# Patient Record
Sex: FEMALE | Race: WHITE | NOT HISPANIC OR LATINO | URBAN - METROPOLITAN AREA
[De-identification: names, ages, dates, MRNs, and addresses within clinical notes are randomized per-mention and may not be internally consistent; named-entity substitution may affect disease eponyms.]

---

## 2022-10-15 ENCOUNTER — INPATIENT (INPATIENT)
Facility: HOSPITAL | Age: 27
LOS: 2 days | Discharge: HOME | End: 2022-10-18
Attending: INTERNAL MEDICINE | Admitting: INTERNAL MEDICINE

## 2022-10-15 VITALS
RESPIRATION RATE: 20 BRPM | WEIGHT: 130.07 LBS | TEMPERATURE: 98 F | SYSTOLIC BLOOD PRESSURE: 150 MMHG | OXYGEN SATURATION: 99 % | DIASTOLIC BLOOD PRESSURE: 99 MMHG | HEART RATE: 97 BPM

## 2022-10-15 LAB
ALBUMIN SERPL ELPH-MCNC: 5 G/DL — SIGNIFICANT CHANGE UP (ref 3.5–5.2)
ALP SERPL-CCNC: 38 U/L — SIGNIFICANT CHANGE UP (ref 30–115)
ALT FLD-CCNC: 15 U/L — SIGNIFICANT CHANGE UP (ref 0–41)
ANION GAP SERPL CALC-SCNC: 13 MMOL/L — SIGNIFICANT CHANGE UP (ref 7–14)
AST SERPL-CCNC: 21 U/L — SIGNIFICANT CHANGE UP (ref 0–41)
BASOPHILS # BLD AUTO: 0.05 K/UL — SIGNIFICANT CHANGE UP (ref 0–0.2)
BASOPHILS NFR BLD AUTO: 0.6 % — SIGNIFICANT CHANGE UP (ref 0–1)
BILIRUB SERPL-MCNC: 0.3 MG/DL — SIGNIFICANT CHANGE UP (ref 0.2–1.2)
BUN SERPL-MCNC: 18 MG/DL — SIGNIFICANT CHANGE UP (ref 10–20)
CALCIUM SERPL-MCNC: 10.1 MG/DL — SIGNIFICANT CHANGE UP (ref 8.4–10.5)
CHLORIDE SERPL-SCNC: 101 MMOL/L — SIGNIFICANT CHANGE UP (ref 98–110)
CO2 SERPL-SCNC: 25 MMOL/L — SIGNIFICANT CHANGE UP (ref 17–32)
CREAT SERPL-MCNC: 0.7 MG/DL — SIGNIFICANT CHANGE UP (ref 0.7–1.5)
EGFR: 123 ML/MIN/1.73M2 — SIGNIFICANT CHANGE UP
EOSINOPHIL # BLD AUTO: 0.18 K/UL — SIGNIFICANT CHANGE UP (ref 0–0.7)
EOSINOPHIL NFR BLD AUTO: 2 % — SIGNIFICANT CHANGE UP (ref 0–8)
GLUCOSE SERPL-MCNC: 94 MG/DL — SIGNIFICANT CHANGE UP (ref 70–99)
HCT VFR BLD CALC: 34.9 % — LOW (ref 37–47)
HGB BLD-MCNC: 12.5 G/DL — SIGNIFICANT CHANGE UP (ref 12–16)
IMM GRANULOCYTES NFR BLD AUTO: 0.1 % — SIGNIFICANT CHANGE UP (ref 0.1–0.3)
LIDOCAIN IGE QN: 30 U/L — SIGNIFICANT CHANGE UP (ref 7–60)
LYMPHOCYTES # BLD AUTO: 2.33 K/UL — SIGNIFICANT CHANGE UP (ref 1.2–3.4)
LYMPHOCYTES # BLD AUTO: 26.5 % — SIGNIFICANT CHANGE UP (ref 20.5–51.1)
MCHC RBC-ENTMCNC: 31.4 PG — HIGH (ref 27–31)
MCHC RBC-ENTMCNC: 35.8 G/DL — SIGNIFICANT CHANGE UP (ref 32–37)
MCV RBC AUTO: 87.7 FL — SIGNIFICANT CHANGE UP (ref 81–99)
MONOCYTES # BLD AUTO: 0.87 K/UL — HIGH (ref 0.1–0.6)
MONOCYTES NFR BLD AUTO: 9.9 % — HIGH (ref 1.7–9.3)
NEUTROPHILS # BLD AUTO: 5.36 K/UL — SIGNIFICANT CHANGE UP (ref 1.4–6.5)
NEUTROPHILS NFR BLD AUTO: 60.9 % — SIGNIFICANT CHANGE UP (ref 42.2–75.2)
NRBC # BLD: 0 /100 WBCS — SIGNIFICANT CHANGE UP (ref 0–0)
PLATELET # BLD AUTO: 255 K/UL — SIGNIFICANT CHANGE UP (ref 130–400)
POTASSIUM SERPL-MCNC: 4.1 MMOL/L — SIGNIFICANT CHANGE UP (ref 3.5–5)
POTASSIUM SERPL-SCNC: 4.1 MMOL/L — SIGNIFICANT CHANGE UP (ref 3.5–5)
PROT SERPL-MCNC: 7.9 G/DL — SIGNIFICANT CHANGE UP (ref 6–8)
RBC # BLD: 3.98 M/UL — LOW (ref 4.2–5.4)
RBC # FLD: 12.4 % — SIGNIFICANT CHANGE UP (ref 11.5–14.5)
SODIUM SERPL-SCNC: 139 MMOL/L — SIGNIFICANT CHANGE UP (ref 135–146)
WBC # BLD: 8.8 K/UL — SIGNIFICANT CHANGE UP (ref 4.8–10.8)
WBC # FLD AUTO: 8.8 K/UL — SIGNIFICANT CHANGE UP (ref 4.8–10.8)

## 2022-10-15 PROCEDURE — 99291 CRITICAL CARE FIRST HOUR: CPT

## 2022-10-15 RX ORDER — DEXAMETHASONE 0.5 MG/5ML
10 ELIXIR ORAL ONCE
Refills: 0 | Status: COMPLETED | OUTPATIENT
Start: 2022-10-15 | End: 2022-10-15

## 2022-10-15 RX ORDER — DIPHENHYDRAMINE HCL 50 MG
25 CAPSULE ORAL ONCE
Refills: 0 | Status: DISCONTINUED | OUTPATIENT
Start: 2022-10-15 | End: 2022-10-15

## 2022-10-15 RX ORDER — DIPHENHYDRAMINE HCL 50 MG
50 CAPSULE ORAL ONCE
Refills: 0 | Status: COMPLETED | OUTPATIENT
Start: 2022-10-15 | End: 2022-10-15

## 2022-10-15 RX ORDER — ONDANSETRON 8 MG/1
4 TABLET, FILM COATED ORAL ONCE
Refills: 0 | Status: COMPLETED | OUTPATIENT
Start: 2022-10-15 | End: 2022-10-15

## 2022-10-15 RX ORDER — FAMOTIDINE 10 MG/ML
40 INJECTION INTRAVENOUS ONCE
Refills: 0 | Status: COMPLETED | OUTPATIENT
Start: 2022-10-15 | End: 2022-10-15

## 2022-10-15 RX ORDER — SODIUM CHLORIDE 9 MG/ML
1000 INJECTION INTRAMUSCULAR; INTRAVENOUS; SUBCUTANEOUS ONCE
Refills: 0 | Status: COMPLETED | OUTPATIENT
Start: 2022-10-15 | End: 2022-10-15

## 2022-10-15 RX ADMIN — SODIUM CHLORIDE 1000 MILLILITER(S): 9 INJECTION INTRAMUSCULAR; INTRAVENOUS; SUBCUTANEOUS at 23:26

## 2022-10-15 RX ADMIN — ONDANSETRON 4 MILLIGRAM(S): 8 TABLET, FILM COATED ORAL at 23:32

## 2022-10-15 RX ADMIN — Medication 10 MILLIGRAM(S): at 23:25

## 2022-10-15 RX ADMIN — Medication 50 MILLIGRAM(S): at 23:26

## 2022-10-15 RX ADMIN — FAMOTIDINE 40 MILLIGRAM(S): 10 INJECTION INTRAVENOUS at 23:26

## 2022-10-15 NOTE — ED ADULT TRIAGE NOTE - CHIEF COMPLAINT QUOTE
Pt having allergic reaction with angioedema and SOB that started 2 hours ago to unknown substance. Pt states her stomach is also hurting

## 2022-10-16 LAB
ALBUMIN SERPL ELPH-MCNC: 4.5 G/DL — SIGNIFICANT CHANGE UP (ref 3.5–5.2)
ALP SERPL-CCNC: 34 U/L — SIGNIFICANT CHANGE UP (ref 30–115)
ALT FLD-CCNC: 13 U/L — SIGNIFICANT CHANGE UP (ref 0–41)
ANION GAP SERPL CALC-SCNC: 9 MMOL/L — SIGNIFICANT CHANGE UP (ref 7–14)
APPEARANCE UR: CLEAR — SIGNIFICANT CHANGE UP
AST SERPL-CCNC: 15 U/L — SIGNIFICANT CHANGE UP (ref 0–41)
BACTERIA # UR AUTO: NEGATIVE — SIGNIFICANT CHANGE UP
BASOPHILS # BLD AUTO: 0.01 K/UL — SIGNIFICANT CHANGE UP (ref 0–0.2)
BASOPHILS NFR BLD AUTO: 0.2 % — SIGNIFICANT CHANGE UP (ref 0–1)
BILIRUB SERPL-MCNC: 0.3 MG/DL — SIGNIFICANT CHANGE UP (ref 0.2–1.2)
BILIRUB UR-MCNC: NEGATIVE — SIGNIFICANT CHANGE UP
BLD GP AB SCN SERPL QL: SIGNIFICANT CHANGE UP
BUN SERPL-MCNC: 13 MG/DL — SIGNIFICANT CHANGE UP (ref 10–20)
CALCIUM SERPL-MCNC: 9.4 MG/DL — SIGNIFICANT CHANGE UP (ref 8.4–10.5)
CHLORIDE SERPL-SCNC: 101 MMOL/L — SIGNIFICANT CHANGE UP (ref 98–110)
CO2 SERPL-SCNC: 28 MMOL/L — SIGNIFICANT CHANGE UP (ref 17–32)
COLOR SPEC: SIGNIFICANT CHANGE UP
CREAT SERPL-MCNC: 0.7 MG/DL — SIGNIFICANT CHANGE UP (ref 0.7–1.5)
CRP SERPL-MCNC: 11.3 MG/L — HIGH
DIFF PNL FLD: ABNORMAL
EGFR: 123 ML/MIN/1.73M2 — SIGNIFICANT CHANGE UP
EOSINOPHIL # BLD AUTO: 0 K/UL — SIGNIFICANT CHANGE UP (ref 0–0.7)
EOSINOPHIL NFR BLD AUTO: 0 % — SIGNIFICANT CHANGE UP (ref 0–8)
EPI CELLS # UR: 3 /HPF — SIGNIFICANT CHANGE UP (ref 0–5)
ERYTHROCYTE [SEDIMENTATION RATE] IN BLOOD: 35 MM/HR — HIGH (ref 0–20)
GLUCOSE SERPL-MCNC: 212 MG/DL — HIGH (ref 70–99)
GLUCOSE UR QL: NEGATIVE — SIGNIFICANT CHANGE UP
HCG SERPL QL: NEGATIVE — SIGNIFICANT CHANGE UP
HCT VFR BLD CALC: 32.5 % — LOW (ref 37–47)
HGB BLD-MCNC: 11.4 G/DL — LOW (ref 12–16)
HYALINE CASTS # UR AUTO: 0 /LPF — SIGNIFICANT CHANGE UP (ref 0–7)
IMM GRANULOCYTES NFR BLD AUTO: 0.2 % — SIGNIFICANT CHANGE UP (ref 0.1–0.3)
KETONES UR-MCNC: SIGNIFICANT CHANGE UP
LEUKOCYTE ESTERASE UR-ACNC: ABNORMAL
LYMPHOCYTES # BLD AUTO: 0.49 K/UL — LOW (ref 1.2–3.4)
LYMPHOCYTES # BLD AUTO: 9.9 % — LOW (ref 20.5–51.1)
MCHC RBC-ENTMCNC: 31.1 PG — HIGH (ref 27–31)
MCHC RBC-ENTMCNC: 35.1 G/DL — SIGNIFICANT CHANGE UP (ref 32–37)
MCV RBC AUTO: 88.8 FL — SIGNIFICANT CHANGE UP (ref 81–99)
MONOCYTES # BLD AUTO: 0.16 K/UL — SIGNIFICANT CHANGE UP (ref 0.1–0.6)
MONOCYTES NFR BLD AUTO: 3.2 % — SIGNIFICANT CHANGE UP (ref 1.7–9.3)
NEUTROPHILS # BLD AUTO: 4.29 K/UL — SIGNIFICANT CHANGE UP (ref 1.4–6.5)
NEUTROPHILS NFR BLD AUTO: 86.5 % — HIGH (ref 42.2–75.2)
NITRITE UR-MCNC: NEGATIVE — SIGNIFICANT CHANGE UP
NRBC # BLD: 0 /100 WBCS — SIGNIFICANT CHANGE UP (ref 0–0)
PH UR: 6.5 — SIGNIFICANT CHANGE UP (ref 5–8)
PLATELET # BLD AUTO: 252 K/UL — SIGNIFICANT CHANGE UP (ref 130–400)
POTASSIUM SERPL-MCNC: 4.5 MMOL/L — SIGNIFICANT CHANGE UP (ref 3.5–5)
POTASSIUM SERPL-SCNC: 4.5 MMOL/L — SIGNIFICANT CHANGE UP (ref 3.5–5)
PROT SERPL-MCNC: 7 G/DL — SIGNIFICANT CHANGE UP (ref 6–8)
PROT UR-MCNC: SIGNIFICANT CHANGE UP
RBC # BLD: 3.66 M/UL — LOW (ref 4.2–5.4)
RBC # FLD: 12.4 % — SIGNIFICANT CHANGE UP (ref 11.5–14.5)
RBC CASTS # UR COMP ASSIST: 2 /HPF — SIGNIFICANT CHANGE UP (ref 0–4)
SARS-COV-2 RNA SPEC QL NAA+PROBE: SIGNIFICANT CHANGE UP
SODIUM SERPL-SCNC: 138 MMOL/L — SIGNIFICANT CHANGE UP (ref 135–146)
SP GR SPEC: 1.05 — HIGH (ref 1.01–1.03)
UROBILINOGEN FLD QL: SIGNIFICANT CHANGE UP
WBC # BLD: 4.96 K/UL — SIGNIFICANT CHANGE UP (ref 4.8–10.8)
WBC # FLD AUTO: 4.96 K/UL — SIGNIFICANT CHANGE UP (ref 4.8–10.8)
WBC UR QL: 6 /HPF — HIGH (ref 0–5)

## 2022-10-16 PROCEDURE — 76830 TRANSVAGINAL US NON-OB: CPT | Mod: 26

## 2022-10-16 PROCEDURE — 99223 1ST HOSP IP/OBS HIGH 75: CPT

## 2022-10-16 PROCEDURE — 99222 1ST HOSP IP/OBS MODERATE 55: CPT

## 2022-10-16 PROCEDURE — 71045 X-RAY EXAM CHEST 1 VIEW: CPT | Mod: 26

## 2022-10-16 PROCEDURE — 74177 CT ABD & PELVIS W/CONTRAST: CPT | Mod: 26,MA

## 2022-10-16 RX ORDER — ONDANSETRON 8 MG/1
4 TABLET, FILM COATED ORAL EVERY 8 HOURS
Refills: 0 | Status: DISCONTINUED | OUTPATIENT
Start: 2022-10-16 | End: 2022-10-18

## 2022-10-16 RX ORDER — FAMOTIDINE 10 MG/ML
20 INJECTION INTRAVENOUS DAILY
Refills: 0 | Status: DISCONTINUED | OUTPATIENT
Start: 2022-10-16 | End: 2022-10-16

## 2022-10-16 RX ORDER — CHLORHEXIDINE GLUCONATE 213 G/1000ML
1 SOLUTION TOPICAL DAILY
Refills: 0 | Status: DISCONTINUED | OUTPATIENT
Start: 2022-10-16 | End: 2022-10-18

## 2022-10-16 RX ORDER — CEFTRIAXONE 500 MG/1
1000 INJECTION, POWDER, FOR SOLUTION INTRAMUSCULAR; INTRAVENOUS EVERY 24 HOURS
Refills: 0 | Status: DISCONTINUED | OUTPATIENT
Start: 2022-10-16 | End: 2022-10-18

## 2022-10-16 RX ORDER — ACETAMINOPHEN 500 MG
650 TABLET ORAL EVERY 6 HOURS
Refills: 0 | Status: DISCONTINUED | OUTPATIENT
Start: 2022-10-16 | End: 2022-10-18

## 2022-10-16 RX ORDER — ENOXAPARIN SODIUM 100 MG/ML
40 INJECTION SUBCUTANEOUS EVERY 24 HOURS
Refills: 0 | Status: DISCONTINUED | OUTPATIENT
Start: 2022-10-16 | End: 2022-10-18

## 2022-10-16 RX ORDER — CHLORHEXIDINE GLUCONATE 213 G/1000ML
1 SOLUTION TOPICAL
Refills: 0 | Status: DISCONTINUED | OUTPATIENT
Start: 2022-10-16 | End: 2022-10-16

## 2022-10-16 RX ORDER — DIPHENHYDRAMINE HCL 50 MG
25 CAPSULE ORAL ONCE
Refills: 0 | Status: COMPLETED | OUTPATIENT
Start: 2022-10-16 | End: 2022-10-16

## 2022-10-16 RX ORDER — LORATADINE 10 MG/1
10 TABLET ORAL
Refills: 0 | Status: DISCONTINUED | OUTPATIENT
Start: 2022-10-16 | End: 2022-10-18

## 2022-10-16 RX ORDER — SUMATRIPTAN SUCCINATE 4 MG/.5ML
50 INJECTION, SOLUTION SUBCUTANEOUS ONCE
Refills: 0 | Status: COMPLETED | OUTPATIENT
Start: 2022-10-16 | End: 2022-10-16

## 2022-10-16 RX ORDER — FAMOTIDINE 10 MG/ML
20 INJECTION INTRAVENOUS
Refills: 0 | Status: DISCONTINUED | OUTPATIENT
Start: 2022-10-16 | End: 2022-10-18

## 2022-10-16 RX ORDER — INFLUENZA VIRUS VACCINE 15; 15; 15; 15 UG/.5ML; UG/.5ML; UG/.5ML; UG/.5ML
0.5 SUSPENSION INTRAMUSCULAR ONCE
Refills: 0 | Status: DISCONTINUED | OUTPATIENT
Start: 2022-10-16 | End: 2022-10-18

## 2022-10-16 RX ORDER — SUMATRIPTAN SUCCINATE 4 MG/.5ML
6 INJECTION, SOLUTION SUBCUTANEOUS ONCE
Refills: 0 | Status: COMPLETED | OUTPATIENT
Start: 2022-10-16 | End: 2022-10-16

## 2022-10-16 RX ORDER — TRANEXAMIC ACID 100 MG/ML
1000 INJECTION, SOLUTION INTRAVENOUS ONCE
Refills: 0 | Status: COMPLETED | OUTPATIENT
Start: 2022-10-16 | End: 2022-10-16

## 2022-10-16 RX ORDER — LANOLIN ALCOHOL/MO/W.PET/CERES
3 CREAM (GRAM) TOPICAL AT BEDTIME
Refills: 0 | Status: DISCONTINUED | OUTPATIENT
Start: 2022-10-16 | End: 2022-10-18

## 2022-10-16 RX ORDER — NICOTINE POLACRILEX 2 MG
1 GUM BUCCAL DAILY
Refills: 0 | Status: DISCONTINUED | OUTPATIENT
Start: 2022-10-16 | End: 2022-10-18

## 2022-10-16 RX ADMIN — FAMOTIDINE 20 MILLIGRAM(S): 10 INJECTION INTRAVENOUS at 11:35

## 2022-10-16 RX ADMIN — LORATADINE 10 MILLIGRAM(S): 10 TABLET ORAL at 17:31

## 2022-10-16 RX ADMIN — ENOXAPARIN SODIUM 40 MILLIGRAM(S): 100 INJECTION SUBCUTANEOUS at 05:26

## 2022-10-16 RX ADMIN — LORATADINE 10 MILLIGRAM(S): 10 TABLET ORAL at 05:24

## 2022-10-16 RX ADMIN — CEFTRIAXONE 100 MILLIGRAM(S): 500 INJECTION, POWDER, FOR SOLUTION INTRAMUSCULAR; INTRAVENOUS at 15:06

## 2022-10-16 RX ADMIN — Medication 1 PATCH: at 11:35

## 2022-10-16 RX ADMIN — Medication 1 PATCH: at 21:08

## 2022-10-16 RX ADMIN — TRANEXAMIC ACID 220 MILLIGRAM(S): 100 INJECTION, SOLUTION INTRAVENOUS at 03:36

## 2022-10-16 RX ADMIN — FAMOTIDINE 20 MILLIGRAM(S): 10 INJECTION INTRAVENOUS at 17:31

## 2022-10-16 RX ADMIN — Medication 60 MILLIGRAM(S): at 05:24

## 2022-10-16 RX ADMIN — Medication 25 MILLIGRAM(S): at 14:42

## 2022-10-16 RX ADMIN — SUMATRIPTAN SUCCINATE 6 MILLIGRAM(S): 4 INJECTION, SOLUTION SUBCUTANEOUS at 14:29

## 2022-10-16 NOTE — ED ADULT NURSE NOTE - FINAL NURSING ELECTRONIC SIGNATURE
Writer assuming care at this time, verbal report received from previous shift RN.    
16-Oct-2022 03:48

## 2022-10-16 NOTE — ED PROVIDER NOTE - OBJECTIVE STATEMENT
27 y.o. female, no PMH, c/o lip swelling. Patient stated that she started to notice a cramping diffuse abdominal pain yesterday and attributed this to her period pains although they felt different from her usual symptoms. Then two hours prior to arrival she noticed that her upper lip started swelling. She states that she has had this happen before and described it as having allergic reaction. She does not know what she is allergic to. She stated the last time she recalls having this episode was after her breast implant surgery. She denies any change in her diet, shortness of breath, nausea, vomiting, rash, pruritus, OCP use or urinary symptoms. She denies any family history of pertinent mucosal swelling or "allergic reactions". Of note, patient states that her blood pressure is usually elevated but takes no  medication for it.

## 2022-10-16 NOTE — CONSULT NOTE ADULT - ATTENDING COMMENTS
Attending Statement: I have personally performed a face to face diagnostic evaluation on this patient. The patient is suffering from:  Angioedema  I have made amendments to the documentation where necessary. I have personally seen and examined this patient.  I have fully participated in the care of this patient.  I have reviewed all pertinent clinical information, including history, physical exam, plan and note.

## 2022-10-16 NOTE — H&P ADULT - NSHPLABSRESULTS_GEN_ALL_CORE
LABS:                        12.5   8.80  )-----------( 255      ( 15 Oct 2022 22:50 )             34.9     Hb Trend: 12.5<--  WBC Trend: 8.80<--  Plt Trend: 255<--          10-15    139  |  101  |  18  ----------------------------<  94  4.1   |  25  |  0.7    Ca    10.1      15 Oct 2022 22:50    TPro  7.9  /  Alb  5.0  /  TBili  0.3  /  DBili  x   /  AST  21  /  ALT  15  /  AlkPhos  38  10-15        Urinalysis Basic - ( 16 Oct 2022 02:41 )    Color: Light Yellow / Appearance: Clear / S.050 / pH: x  Gluc: x / Ketone: Trace  / Bili: Negative / Urobili: <2 mg/dL   Blood: x / Protein: Trace / Nitrite: Negative   Leuk Esterase: Small / RBC: x / WBC x   Sq Epi: x / Non Sq Epi: x / Bacteria: x      IMAGING:  reviewed

## 2022-10-16 NOTE — CONSULT NOTE ADULT - SUBJECTIVE AND OBJECTIVE BOX
Patient is a 27y old  Female who presents with a chief complaint of Angioedema (16 Oct 2022 07:49)      HPI:  28 y/o female with no pertinent medical history presenting with complaint of lip swelling. Patient stated that she started to notice a cramping diffuse abdominal pain yesterday and attributed this to her period pains although they felt different from her usual symptoms. Then two hous prior to presentation in the ED, she noticed that her upper lip started swelling. She states that she has had this happen before and described it as  having allergic reaction. She does not know what she is allergic to. She stated the last time she recalls having this episode was after her breast implant surgery. She denies any change in her diet, shortness of breath, nausea, vomiting, rash, pruritus, OCP use or urinary symptoms. She denies any family history of pertinent mucosal swelling or "allergic reactions". Of note, patient states that her blood pressure is usually elevated but takes no  medication for it     In the ED, vitals were /99, HR 97, RR 20, T 98.4, SpO2 99% on RA. Transvaginal US showed No definite sonographic evidence of ovarian torsion. CTAP showed Mild right hydronephrosis without visualization of an obstructing calculus. Labs were unremarkable. UA negative. Patient received 1L NS bolus, 10mg IV decadron and 25mg benadryl in the ED.     Patient is being admitted for Idiopathic  angioedema    (16 Oct 2022 03:04)      PAST MEDICAL & SURGICAL HISTORY:  Blood pressure elevated without history of HTN      No significant past surgical history          SOCIAL HX:   Smoking                         ETOH                            Other    FAMILY HISTORY:  Family history of hypertension    :  No known cardiovacular family hisotry     Review Of Systems:     All ROS are negative except per HPI       Allergies    Allergy Status Unknown    Intolerances          PHYSICAL EXAM    ICU Vital Signs Last 24 Hrs  T(C): 35.8 (16 Oct 2022 08:00), Max: 36.9 (15 Oct 2022 22:19)  T(F): 96.5 (16 Oct 2022 08:00), Max: 98.4 (15 Oct 2022 22:19)  HR: 62 (16 Oct 2022 08:00) (58 - 97)  BP: 101/57 (16 Oct 2022 08:00) (94/53 - 150/99)  BP(mean): 70 (16 Oct 2022 08:00) (67 - 107)  ABP: --  ABP(mean): --  RR: 16 (16 Oct 2022 08:00) (14 - 20)  SpO2: 100% (16 Oct 2022 08:00) (98% - 100%)    O2 Parameters below as of 16 Oct 2022 08:00  Patient On (Oxygen Delivery Method): room air            CONSTITUTIONAL:  Well nourished.   NAD    ENT:   Airway patent,   Mouth with normal mucosa.   No thrush  upper lip swelling      CARDIAC:   Normal rate,   Regular rhythm.    No edema    Vascular:   normal systolic impulse  no bruits    RESPIRATORY:   No wheezing  Bilateral BS   Not tachypneic,  No use of accessory muscles    GASTROINTESTINAL:  Abdomen soft,   Non-tender,   No guarding,     NEUROLOGICAL:   Alert and oriented   No motor deficits.    SKIN:   Skin normal color for race,   No evidence of rash.        10-15-22 @ 07:01  -  10-16-22 @ 07:00  --------------------------------------------------------  IN:    Oral Fluid: 60 mL  Total IN: 60 mL    OUT:  Total OUT: 0 mL    Total NET: 60 mL          LABS:                          12.5   8.80  )-----------( 255      ( 15 Oct 2022 22:50 )             34.9                                               10-15    139  |  101  |  18  ----------------------------<  94  4.1   |  25  |  0.7    Ca    10.1      15 Oct 2022 22:50    TPro  7.9  /  Alb  5.0  /  TBili  0.3  /  DBili  x   /  AST  21  /  ALT  15  /  AlkPhos  38  10-15                                             Urinalysis Basic - ( 16 Oct 2022 02:41 )    Color: Light Yellow / Appearance: Clear / S.050 / pH: x  Gluc: x / Ketone: Trace  / Bili: Negative / Urobili: <2 mg/dL   Blood: x / Protein: Trace / Nitrite: Negative   Leuk Esterase: Small / RBC: 2 /HPF / WBC 6 /HPF   Sq Epi: x / Non Sq Epi: 3 /HPF / Bacteria: Negative                                                  LIVER FUNCTIONS - ( 15 Oct 2022 22:50 )  Alb: 5.0 g/dL / Pro: 7.9 g/dL / ALK PHOS: 38 U/L / ALT: 15 U/L / AST: 21 U/L / GGT: x                                                                                                                                       X-Rays reviewed                                                                                     ECHO    CXR interpreted by me     MEDICATIONS  (STANDING):  chlorhexidine 2% Cloths 1 Application(s) Topical daily  enoxaparin Injectable 40 milliGRAM(s) SubCutaneous every 24 hours  influenza   Vaccine 0.5 milliLiter(s) IntraMuscular once  loratadine 10 milliGRAM(s) Oral two times a day  methylPREDNISolone sodium succinate Injectable 60 milliGRAM(s) IV Push daily    MEDICATIONS  (PRN):  acetaminophen     Tablet .. 650 milliGRAM(s) Oral every 6 hours PRN Temp greater or equal to 38C (100.4F), Mild Pain (1 - 3)  aluminum hydroxide/magnesium hydroxide/simethicone Suspension 30 milliLiter(s) Oral every 4 hours PRN Dyspepsia  melatonin 3 milliGRAM(s) Oral at bedtime PRN Insomnia  ondansetron Injectable 4 milliGRAM(s) IV Push every 8 hours PRN Nausea and/or Vomiting

## 2022-10-16 NOTE — H&P ADULT - ASSESSMENT
28 y/o female with no pertinent medical history presenting with complaint of lip swelling. Pt having allergic reaction with angioedema and SOB that started 2 hours ago to unknown substance. Pt states her stomach is also hurting. right lower quadrant pain     In the ED, vitals were /99, HR 97, RR 20, T 98.4, SpO2 99% on RA. Transvaginal US showed No definite sonographic evidence of ovarian torsion. CTAP showed Mild right hydronephrosis without visualization of an obstructing calculus. Labs were unremarkable. UA negative     Patient is being admitted for angioedema     IMPRESSION  Angioedema   Elevated blood pressure  Right hydronephrosis     PLAN    CNS: Avoid CNS Depressants.     HEENT: Oral care. Aspiration precautions. Check c1 inhibitor, complement C4. Start solumedrol 60mg daily. Start Cetirizine 10mg twice daily.  ENT eval     PULMONARY: HOB @ 45. Monitor Pulse Ox. Keep > 93%. Supplement as needed. CXR in AM     CARDIOVASCULAR: Elevated blood pressure. Monitor BP. Will hold off on antihypertensives.     GI: GI prophylaxis. Keep NPO for now. Avoid hepatotoxic meds.     RENAL: Avoid nephrotoxic agents Replete electrolytes as needed     INFECTIOUS DISEASE: UA negative. No leukocytosis. No concern for infection. Monitor off Abx.  Check ESR, CRP     HEMATOLOGICAL: DVT prophylaxis. Monitor Hb, no acute signs of bleeding    ENDOCRINE: Keep FS <180    MUSCULOSKELETAL: Bedrest     CODE STATUS: FULL     DISPOSITION: MICU   26 y/o female with no pertinent medical history presenting with complaint of lip swelling. Pt having allergic reaction with angioedema and SOB that started 2 hours ago to unknown substance. Pt states her stomach is also hurting. right lower quadrant pain     In the ED, vitals were /99, HR 97, RR 20, T 98.4, SpO2 99% on RA. Transvaginal US showed No definite sonographic evidence of ovarian torsion. CTAP showed Mild right hydronephrosis without visualization of an obstructing calculus. Labs were unremarkable. UA negative     Patient is being admitted for angioedema     IMPRESSION  Angioedema   Elevated blood pressure  Right hydronephrosis     PLAN    CNS: Avoid CNS Depressants.     HEENT: Oral care. Aspiration precautions. Check c1 inhibitor, complement C4. Start solumedrol 60mg daily. Start Loratidine 10mg twice daily.  ENT eval     PULMONARY: HOB @ 45. Monitor Pulse Ox. Keep > 93%. Supplement as needed. CXR in AM     CARDIOVASCULAR: Elevated blood pressure. Monitor BP. Will hold off on antihypertensives.     GI: GI prophylaxis. Keep NPO for now. Avoid hepatotoxic meds.     RENAL: Avoid nephrotoxic agents Replete electrolytes as needed     INFECTIOUS DISEASE: UA negative. No leukocytosis. No concern for infection. Monitor off Abx.  Check ESR, CRP     HEMATOLOGICAL: DVT prophylaxis. Monitor Hb, no acute signs of bleeding    ENDOCRINE: Keep FS <180    MUSCULOSKELETAL: Ambulate as tolerated    CODE STATUS: FULL     DISPOSITION: MICU   28 y/o female with no pertinent medical history presenting with complaint of lip swelling. Patient stated that she started to notice a cramping diffuse abdominal pain yesterday and attributed this to her period pains although they felt different from her usual symptoms. Then two hous prior to presentation in the ED, she noticed that her upper lip started swelling. She states that she has had this happen before and described it as  having allergic reaction. She does not know what she is allergic to. She stated the last time she recalls having this episode was after her breast implant surgery. She denies any change in her diet, shortness of breath, nausea, vomiting, rash, pruritus, OCP use or urinary symptoms. She denies any family history of pertinent mucosal swelling or "allergic reactions". Of note, patient states that her blood pressure is usually elevated but takes no  medication for it     In the ED, vitals were /99, HR 97, RR 20, T 98.4, SpO2 99% on RA. Transvaginal US showed No definite sonographic evidence of ovarian torsion. CTAP showed Mild right hydronephrosis without visualization of an obstructing calculus. Labs were unremarkable. UA negative. Patient received 1L NS bolus, 10mg IV decadron and 25mg benadryl in the ED.     Patient is being admitted for Idiopathic  angioedema     IMPRESSION  Idiopathic Angioedema   Elevated blood pressure  Right hydronephrosis     PLAN    CNS: Avoid CNS Depressants.     HEENT: Oral care. Aspiration precautions. Check c1 inhibitor, complement C4. Start solumedrol 60mg daily. Start Loratidine 10mg twice daily.  ENT eval     PULMONARY: HOB @ 45. Monitor Pulse Ox. Keep > 93%. Supplement as needed. CXR in AM     CARDIOVASCULAR: Elevated blood pressure. Monitor BP. Will hold off on antihypertensives.     GI: GI prophylaxis. Keep NPO for now. Avoid hepatotoxic meds.     RENAL: Avoid nephrotoxic agents Replete electrolytes as needed     INFECTIOUS DISEASE: UA negative. No leukocytosis. No concern for infection. Monitor off Abx.  Check ESR, CRP     HEMATOLOGICAL: DVT prophylaxis. Monitor Hb, no acute signs of bleeding    ENDOCRINE: Keep FS <180    MUSCULOSKELETAL: Ambulate as tolerated    CODE STATUS: FULL     DISPOSITION: MICU

## 2022-10-16 NOTE — ED PROVIDER NOTE - PHYSICAL EXAMINATION
pt in NAD, AAOx3, head NC/AT, CN II-XII intact, PEERL, EOMi, (+) swelling to upper lip, tongue normal size, uvula midline, neck (-) midline tenderness, lungs CTA B/L, CV S1S2 regular, abdomen soft/generalized discomfort mostly in RLQ/ND/(+)BS, ext (-) edema, motor 5/5x4, sensation intact, ambulating with steady gait. Labs/CT done and reviewed. Pt treated for allergic reaction. Will admit to ICU for airway monitoring.

## 2022-10-16 NOTE — ED ADULT NURSE REASSESSMENT NOTE - NS ED NURSE REASSESS COMMENT FT1
Patient received in cramer, alert and oreinted x4. Patient reports feeling much better and expresses desire to leave as dog is home alone. Patient updated on plan of care regarding admission to ICU and verbalized understanding.

## 2022-10-16 NOTE — PATIENT PROFILE ADULT - FALL HARM RISK - UNIVERSAL INTERVENTIONS
Bed in lowest position, wheels locked, appropriate side rails in place/Call bell, personal items and telephone in reach/Instruct patient to call for assistance before getting out of bed or chair/Non-slip footwear when patient is out of bed/Tamiment to call system/Physically safe environment - no spills, clutter or unnecessary equipment/Purposeful Proactive Rounding/Room/bathroom lighting operational, light cord in reach

## 2022-10-16 NOTE — ED PROVIDER NOTE - CRITICAL CARE ATTENDING CONTRIBUTION TO CARE
27 y.o. female, no PMH, c/o lip swelling. Patient stated that she started to notice a cramping diffuse abdominal pain yesterday and attributed this to her period pains although they felt different from her usual symptoms. Then two hours prior to arrival she noticed that her upper lip started swelling. She states that she has had this happen before and described it as having allergic reaction. She does not know what she is allergic to. She stated the last time she recalls having this episode was after her breast implant surgery. She denies any change in her diet, shortness of breath, nausea, vomiting, rash, pruritus, OCP use or urinary symptoms. She denies any family history of pertinent mucosal swelling or "allergic reactions". Of note, patient states that her blood pressure is usually elevated but takes no  medication for it. On exam, pt in NAD, AAOx3, head NC/AT, CN II-XII intact, PEERL, EOMi, (+) swelling to upper lip, tongue normal size, uvula midline, neck (-) midline tenderness, lungs CTA B/L, CV S1S2 regular, abdomen soft/generalized discomfort mostly in RLQ/ND/(+)BS, ext (-) edema, motor 5/5x4, sensation intact, ambulating with steady gait. Labs/CT done and reviewed. Pt treated for allergic reaction. Will admit to ICU for airway monitoring.

## 2022-10-16 NOTE — H&P ADULT - HISTORY OF PRESENT ILLNESS
28 y/o female with no pertinent medical history presenting with complaint of lip swelling. Pt having allergic reaction with angioedema and SOB that started 2 hours ago to unknown substance. Pt states her stomach is also hurting. right lower quadrant pain     In the ED, vitals were /99, HR 97, RR 20, T 98.4, SpO2 99% on RA. Transvaginal US showed No definite sonographic evidence of ovarian torsion. CTAP showed Mild right hydronephrosis without visualization of an obstructing calculus. Labs were unremarkable. UA negative. Patient received 1L NS bolus, 10mg IV decadron and 25mg benadryl in the ED.     Patient is being admitted for angioedema    26 y/o female with no pertinent medical history presenting with complaint of lip swelling. Patient stated that she started to notice a cramping diffuse abdominal pain yesterday and attributed this to her period pains although they felt different from her usual symptoms. Then two hous prior to presentation in the ED, she noticed that her upper lip started swelling. She states that she has had this happen before and described it as  having allergic reaction. She does not know what she is allergic to. She stated the last time she recalls having this episode was after her breast implant surgery. She denies any change in her diet, shortness of breath, nausea, vomiting, rash, pruritus, OCP use or urinary symptoms. She denies any family history of pertinent mucosal swelling or "allergic reactions". Of note, patient states that her blood pressure is usually elevated but takes no  medication for it     In the ED, vitals were /99, HR 97, RR 20, T 98.4, SpO2 99% on RA. Transvaginal US showed No definite sonographic evidence of ovarian torsion. CTAP showed Mild right hydronephrosis without visualization of an obstructing calculus. Labs were unremarkable. UA negative. Patient received 1L NS bolus, 10mg IV decadron and 25mg benadryl in the ED.     Patient is being admitted for Idiopathic  angioedema

## 2022-10-16 NOTE — ED ADULT NURSE NOTE - OBJECTIVE STATEMENT
Pt having allergic reaction with angioedema and SOB that started 2 hours ago to unknown substance. Pt states her stomach is also hurting. denies cp.

## 2022-10-16 NOTE — PATIENT PROFILE ADULT - SAFE PLACE TO LIVE
Patient would like to know if Dr. Bennett would refill the Diflucan for him. He has been getting it from another doctor. He would like it sent to [facility] on [address]. Any questions-please call him back.   no

## 2022-10-16 NOTE — H&P ADULT - NSHPPHYSICALEXAM_GEN_ALL_CORE
GENERAL: NAD, speaks in full sentences, no signs of respiratory distress  HEAD:  Atraumatic, Normocephalic  EYES: EOMI, PERRLA, anicteric sclera  NECK: Supple, No JVD  CHEST/LUNG: Clear to auscultation bilaterally; No wheeze; No crackles; No accessory muscles used  HEART: Regular rate and rhythm; No murmurs;   ABDOMEN: Soft, Nontender, Nondistended; Bowel sounds present; No guarding  EXTREMITIES:  2+ Peripheral Pulses, No cyanosis or edema  PSYCH: AAOx3  NEUROLOGY: non-focal  SKIN: No rashes or lesions GENERAL: NAD, speaks in full sentences, no signs of respiratory distress, mucosal upper swelling  HEAD:  Atraumatic, Normocephalic  EYES: EOMI, PERRLA, anicteric sclera  NECK: Supple, No JVD, no swelling larynx  CHEST/LUNG: Clear to auscultation bilaterally; No wheeze; No crackles; No accessory muscles used  HEART: Regular rate and rhythm; No murmurs;   ABDOMEN: Soft, Nontender, Nondistended; Bowel sounds present; No guarding  EXTREMITIES:  2+ Peripheral Pulses, No cyanosis or edema  PSYCH: AAOx3  NEUROLOGY: non-focal  SKIN: No rashes or lesions

## 2022-10-16 NOTE — CONSULT NOTE ADULT - NS ATTEND AMEND GEN_ALL_CORE FT
Patient seen and examined at bedside.    No airway compromise at this time. No dysphagia.    OK for transfer to floor.

## 2022-10-17 LAB
ALBUMIN SERPL ELPH-MCNC: 4 G/DL — SIGNIFICANT CHANGE UP (ref 3.5–5.2)
ALP SERPL-CCNC: 31 U/L — SIGNIFICANT CHANGE UP (ref 30–115)
ALT FLD-CCNC: 10 U/L — SIGNIFICANT CHANGE UP (ref 0–41)
ANION GAP SERPL CALC-SCNC: 12 MMOL/L — SIGNIFICANT CHANGE UP (ref 7–14)
AST SERPL-CCNC: 12 U/L — SIGNIFICANT CHANGE UP (ref 0–41)
BASOPHILS # BLD AUTO: 0.04 K/UL — SIGNIFICANT CHANGE UP (ref 0–0.2)
BASOPHILS NFR BLD AUTO: 0.4 % — SIGNIFICANT CHANGE UP (ref 0–1)
BILIRUB SERPL-MCNC: <0.2 MG/DL — SIGNIFICANT CHANGE UP (ref 0.2–1.2)
BUN SERPL-MCNC: 13 MG/DL — SIGNIFICANT CHANGE UP (ref 10–20)
C4 SERPL-MCNC: 28 MG/DL — SIGNIFICANT CHANGE UP (ref 13–39)
CALCIUM SERPL-MCNC: 8.8 MG/DL — SIGNIFICANT CHANGE UP (ref 8.4–10.5)
CHLORIDE SERPL-SCNC: 104 MMOL/L — SIGNIFICANT CHANGE UP (ref 98–110)
CO2 SERPL-SCNC: 24 MMOL/L — SIGNIFICANT CHANGE UP (ref 17–32)
CREAT SERPL-MCNC: 0.7 MG/DL — SIGNIFICANT CHANGE UP (ref 0.7–1.5)
EGFR: 123 ML/MIN/1.73M2 — SIGNIFICANT CHANGE UP
EOSINOPHIL # BLD AUTO: 0.1 K/UL — SIGNIFICANT CHANGE UP (ref 0–0.7)
EOSINOPHIL NFR BLD AUTO: 1.1 % — SIGNIFICANT CHANGE UP (ref 0–8)
GLUCOSE SERPL-MCNC: 99 MG/DL — SIGNIFICANT CHANGE UP (ref 70–99)
HCT VFR BLD CALC: 31.2 % — LOW (ref 37–47)
HGB BLD-MCNC: 11.2 G/DL — LOW (ref 12–16)
IMM GRANULOCYTES NFR BLD AUTO: 0.2 % — SIGNIFICANT CHANGE UP (ref 0.1–0.3)
LYMPHOCYTES # BLD AUTO: 3.53 K/UL — HIGH (ref 1.2–3.4)
LYMPHOCYTES # BLD AUTO: 37.2 % — SIGNIFICANT CHANGE UP (ref 20.5–51.1)
MAGNESIUM SERPL-MCNC: 1.8 MG/DL — SIGNIFICANT CHANGE UP (ref 1.8–2.4)
MCHC RBC-ENTMCNC: 32 PG — HIGH (ref 27–31)
MCHC RBC-ENTMCNC: 35.9 G/DL — SIGNIFICANT CHANGE UP (ref 32–37)
MCV RBC AUTO: 89.1 FL — SIGNIFICANT CHANGE UP (ref 81–99)
MONOCYTES # BLD AUTO: 0.81 K/UL — HIGH (ref 0.1–0.6)
MONOCYTES NFR BLD AUTO: 8.5 % — SIGNIFICANT CHANGE UP (ref 1.7–9.3)
NEUTROPHILS # BLD AUTO: 5 K/UL — SIGNIFICANT CHANGE UP (ref 1.4–6.5)
NEUTROPHILS NFR BLD AUTO: 52.6 % — SIGNIFICANT CHANGE UP (ref 42.2–75.2)
NRBC # BLD: 0 /100 WBCS — SIGNIFICANT CHANGE UP (ref 0–0)
PLATELET # BLD AUTO: 223 K/UL — SIGNIFICANT CHANGE UP (ref 130–400)
POTASSIUM SERPL-MCNC: 3.9 MMOL/L — SIGNIFICANT CHANGE UP (ref 3.5–5)
POTASSIUM SERPL-SCNC: 3.9 MMOL/L — SIGNIFICANT CHANGE UP (ref 3.5–5)
PROT SERPL-MCNC: 6.2 G/DL — SIGNIFICANT CHANGE UP (ref 6–8)
RBC # BLD: 3.5 M/UL — LOW (ref 4.2–5.4)
RBC # FLD: 12.9 % — SIGNIFICANT CHANGE UP (ref 11.5–14.5)
SODIUM SERPL-SCNC: 140 MMOL/L — SIGNIFICANT CHANGE UP (ref 135–146)
WBC # BLD: 9.5 K/UL — SIGNIFICANT CHANGE UP (ref 4.8–10.8)
WBC # FLD AUTO: 9.5 K/UL — SIGNIFICANT CHANGE UP (ref 4.8–10.8)

## 2022-10-17 PROCEDURE — 99233 SBSQ HOSP IP/OBS HIGH 50: CPT

## 2022-10-17 RX ORDER — DEXTROAMPHETAMINE SACCHARATE, AMPHETAMINE ASPARTATE, DEXTROAMPHETAMINE SULFATE AND AMPHETAMINE SULFATE 1.875; 1.875; 1.875; 1.875 MG/1; MG/1; MG/1; MG/1
10 TABLET ORAL ONCE
Refills: 0 | Status: DISCONTINUED | OUTPATIENT
Start: 2022-10-17 | End: 2022-10-17

## 2022-10-17 RX ORDER — METHYLPHENIDATE HCL 5 MG
10 TABLET ORAL
Refills: 0 | Status: DISCONTINUED | OUTPATIENT
Start: 2022-10-17 | End: 2022-10-18

## 2022-10-17 RX ORDER — KETOROLAC TROMETHAMINE 30 MG/ML
15 SYRINGE (ML) INJECTION ONCE
Refills: 0 | Status: DISCONTINUED | OUTPATIENT
Start: 2022-10-17 | End: 2022-10-17

## 2022-10-17 RX ORDER — DEXTROAMPHETAMINE SACCHARATE, AMPHETAMINE ASPARTATE, DEXTROAMPHETAMINE SULFATE AND AMPHETAMINE SULFATE 1.875; 1.875; 1.875; 1.875 MG/1; MG/1; MG/1; MG/1
10 TABLET ORAL
Refills: 0 | Status: DISCONTINUED | OUTPATIENT
Start: 2022-10-17 | End: 2022-10-17

## 2022-10-17 RX ADMIN — Medication 1 PATCH: at 12:40

## 2022-10-17 RX ADMIN — Medication 10 MILLIGRAM(S): at 14:34

## 2022-10-17 RX ADMIN — Medication 30 MILLILITER(S): at 16:18

## 2022-10-17 RX ADMIN — Medication 15 MILLIGRAM(S): at 18:53

## 2022-10-17 RX ADMIN — FAMOTIDINE 20 MILLIGRAM(S): 10 INJECTION INTRAVENOUS at 17:08

## 2022-10-17 RX ADMIN — Medication 60 MILLIGRAM(S): at 05:43

## 2022-10-17 RX ADMIN — Medication 650 MILLIGRAM(S): at 16:18

## 2022-10-17 RX ADMIN — Medication 1 PATCH: at 12:52

## 2022-10-17 RX ADMIN — CEFTRIAXONE 100 MILLIGRAM(S): 500 INJECTION, POWDER, FOR SOLUTION INTRAMUSCULAR; INTRAVENOUS at 16:15

## 2022-10-17 RX ADMIN — ENOXAPARIN SODIUM 40 MILLIGRAM(S): 100 INJECTION SUBCUTANEOUS at 05:43

## 2022-10-17 RX ADMIN — LORATADINE 10 MILLIGRAM(S): 10 TABLET ORAL at 17:07

## 2022-10-17 RX ADMIN — CHLORHEXIDINE GLUCONATE 1 APPLICATION(S): 213 SOLUTION TOPICAL at 12:52

## 2022-10-17 RX ADMIN — LORATADINE 10 MILLIGRAM(S): 10 TABLET ORAL at 05:43

## 2022-10-17 RX ADMIN — FAMOTIDINE 20 MILLIGRAM(S): 10 INJECTION INTRAVENOUS at 05:43

## 2022-10-17 RX ADMIN — Medication 1 PATCH: at 07:22

## 2022-10-17 NOTE — PHARMACOTHERAPY INTERVENTION NOTE - COMMENTS
-recommended changing famotidine 20mg po to q12h
Adderall XR 10mg -non formulary, recommended methylphenidate 10mg po 7am & 1400
sumatriptan 50mg po x1-non formulary, recommended changing 6mg sc x1
taper prednisone 40mg po daily-recommend start 10/18, pt received 50mg earlier today

## 2022-10-17 NOTE — PHARMACOTHERAPY INTERVENTION NOTE - INTERVENTION TYPE RECOOMEND
Timing/Frequency of Administration Recommended
Dose Optimization/Non-renal Dose Adjustment
Route of Administration Change
Therapy Recommended - Formulary adherence

## 2022-10-17 NOTE — CHART NOTE - NSCHARTNOTEFT_GEN_A_CORE
ICU Transfer Note    Transfer from: ICU  Transfer to: SDU      ICU COURSE:  26 y/o female with no pertinent medical history presenting with complaint of lip swelling. Patient stated that she started to notice a cramping diffuse abdominal pain yesterday and attributed this to her period pains although they felt different from her usual symptoms. Then two hours prior to presentation in the ED, she noticed that her upper lip started swelling. She states that she has had this happen before and described it as  having allergic reaction. She does not know what she is allergic to. She stated the last time she recalls having this episode was after her breast implant surgery. She denies any change in her diet, shortness of breath, nausea, vomiting, rash, pruritus, OCP use or urinary symptoms. She denies any family history of pertinent mucosal swelling or "allergic reactions". Of note, patient states that her blood pressure is usually elevated but takes no  medication for it     In the ED, vitals were /99, HR 97, RR 20, T 98.4, SpO2 99% on RA. Transvaginal US showed No definite sonographic evidence of ovarian torsion. CTAP showed Mild right hydronephrosis without visualization of an obstructing calculus. Labs were unremarkable. UA negative. Patient received 1L NS bolus, 10mg IV decadron and 25mg benadryl in the ED.   Patient is being admitted for Idiopathic  angioedema.     Patient's swelling improved after steroids and benadryl. patient resting comfortably. Was evaluated by ENT, no indication laryngoscope. Recommended stable for downgrade and to follow up with allergist/immunologist and be sent home with epipen on DC. Plan for transition to oral steroids. Continue with H1/H2 blockers. Was found to have evidence of pyelonephritis on CT. patient has been complaining of R sided abdominal pain. Also endorsed that 1 week ago she was having increased urinary frequency and dysuria. Was started on course of IV Rocephin. Patient is stable for down grade, Can be discharged tomorrow after 3rd dose of rocephin on oral abx.    ASSESSMENT & PLAN:     #Idiopathic Angioedema   - ENT eval appreciated. HOB @ 45 degrees.  Aspiration precautions. Prednisone 60 mg. benadryl PRN. C/w H1/H2 blocker.  - f/u allergy Op  - recommend to send home with Epi-pen    #pyelonephritis?  - started on rocephin IV  - f/u Urine cultures, Blod cultues    For Follow-Up:  f/u clinical swelling.
ICU Transfer Note    Transfer from: ICU  Transfer to:  SDU      ICU COURSE:  26 y/o female with no pertinent medical history presenting with complaint of lip swelling. Patient stated that she started to notice a cramping diffuse abdominal pain yesterday and attributed this to her period pains although they felt different from her usual symptoms. Then two hours prior to presentation in the ED, she noticed that her upper lip started swelling. She states that she has had this happen before and described it as  having allergic reaction. She does not know what she is allergic to. She stated the last time she recalls having this episode was after her breast implant surgery. She denies any change in her diet, shortness of breath, nausea, vomiting, rash, pruritus, OCP use or urinary symptoms. She denies any family history of pertinent mucosal swelling or "allergic reactions". Of note, patient states that her blood pressure is usually elevated but takes no  medication for it     In the ED, vitals were /99, HR 97, RR 20, T 98.4, SpO2 99% on RA. Transvaginal US showed No definite sonographic evidence of ovarian torsion. CTAP showed Mild right hydronephrosis without visualization of an obstructing calculus. Labs were unremarkable. UA negative. Patient received 1L NS bolus, 10mg IV decadron and 25mg benadryl in the ED.   Patient is being admitted for Idiopathic  angioedema.     Patient's swelling improved after steroids and benadyl. patient resting comfortably. Was evaluated by ENT, no indication laryngoscope. Recommended stable for downgrade and to follow up with allergist/immunologist and be sent home with epipen on DC. Plan for transition to oral steroids. Continue with H1/H2 blockers. Was found to have evidence of pyelonephritis on CT. patient has been complaining of R sided abdominal pain. Also endorsed that 1 week ago she was having increased urinary frequency and dysuria. Was started on course of Rocephin.     ASSESSMENT & PLAN:     #Idiopathic Angioedema   - ENT eval appreciated. HOB @ 45 degrees.  Aspiration precautions. Prednisone 60 mg. benadryl PRN. C/w H1/H2 blocker.  - f/u allergy Op  - recommend to send home with Epi-pen    #pyelonephritis?  - started on rocephin   - f/u cultures    For Follow-Up:  f/u clinical swelling

## 2022-10-18 VITALS — HEART RATE: 75 BPM | DIASTOLIC BLOOD PRESSURE: 59 MMHG | SYSTOLIC BLOOD PRESSURE: 108 MMHG

## 2022-10-18 PROCEDURE — 99239 HOSP IP/OBS DSCHRG MGMT >30: CPT

## 2022-10-18 RX ORDER — CEFPODOXIME PROXETIL 100 MG
1 TABLET ORAL
Qty: 0 | Refills: 0 | DISCHARGE
Start: 2022-10-18

## 2022-10-18 RX ORDER — NICOTINE POLACRILEX 2 MG
1 GUM BUCCAL
Qty: 14 | Refills: 0
Start: 2022-10-18

## 2022-10-18 RX ORDER — SPIRONOLACTONE 25 MG/1
1 TABLET, FILM COATED ORAL
Qty: 0 | Refills: 0 | DISCHARGE

## 2022-10-18 RX ORDER — EPINEPHRINE 0.3 MG/.3ML
1 INJECTION INTRAMUSCULAR; SUBCUTANEOUS
Qty: 0 | Refills: 0 | DISCHARGE

## 2022-10-18 RX ORDER — EPINEPHRINE 0.3 MG/.3ML
1 INJECTION INTRAMUSCULAR; SUBCUTANEOUS
Qty: 1 | Refills: 0
Start: 2022-10-18

## 2022-10-18 RX ORDER — CEFPODOXIME PROXETIL 100 MG
200 TABLET ORAL EVERY 12 HOURS
Refills: 0 | Status: DISCONTINUED | OUTPATIENT
Start: 2022-10-18 | End: 2022-10-18

## 2022-10-18 RX ORDER — EPINEPHRINE 0.3 MG/.3ML
3 INJECTION INTRAMUSCULAR; SUBCUTANEOUS
Qty: 1 | Refills: 0
Start: 2022-10-18

## 2022-10-18 RX ORDER — CEFPODOXIME PROXETIL 100 MG
1 TABLET ORAL
Qty: 10 | Refills: 0
Start: 2022-10-18 | End: 2022-10-22

## 2022-10-18 RX ORDER — PANTOPRAZOLE SODIUM 20 MG/1
1 TABLET, DELAYED RELEASE ORAL
Qty: 7 | Refills: 0
Start: 2022-10-18 | End: 2022-10-24

## 2022-10-18 RX ADMIN — FAMOTIDINE 20 MILLIGRAM(S): 10 INJECTION INTRAVENOUS at 05:14

## 2022-10-18 RX ADMIN — Medication 1 PATCH: at 08:03

## 2022-10-18 RX ADMIN — Medication 10 MILLIGRAM(S): at 08:50

## 2022-10-18 RX ADMIN — Medication 40 MILLIGRAM(S): at 05:15

## 2022-10-18 RX ADMIN — ENOXAPARIN SODIUM 40 MILLIGRAM(S): 100 INJECTION SUBCUTANEOUS at 05:15

## 2022-10-18 RX ADMIN — LORATADINE 10 MILLIGRAM(S): 10 TABLET ORAL at 05:15

## 2022-10-18 RX ADMIN — Medication 200 MILLIGRAM(S): at 09:08

## 2022-10-18 NOTE — DISCHARGE NOTE PROVIDER - HOSPITAL COURSE
Ms. Ariza is a 28 y/o female with no pertinent medical history presented with complaint of lip swelling. Patient stated that she started to notice a cramping diffuse abdominal pain yesterday and attributed this to her period pains although they felt different from her usual symptoms. Then two hous prior to presentation in the ED, she noticed that her upper lip started swelling. She states that she has had this happen before after her breast implant surgery and described it as having an allergic reaction. She denies any change in her diet, shortness of breath, nausea, vomiting, rash, pruritus, OCP use. She has been complaining of R sided abdominal pain. Also endorsed that 1 week ago she was having increased urinary frequency and dysuria.    In the ED, vitals were /99, HR 97, RR 20, T 98.4, SpO2 99% on RA. Transvaginal US showed No definite sonographic evidence of ovarian torsion. CTAP showed Mild right hydronephrosis without visualization of an obstructing calculus. Labs were unremarkable. UA negative. Patient received 1L NS bolus, 10mg IV decadron and 25mg benadryl in the ED. Patient was admitted for follow-up on Idiopathic angioedema with the critical care team with followup from ENT.    In the hospital, Patient's swelling improved after steroids and benadryl. She Was evaluated by ENT, no indication laryngoscope. Recommended stable for downgrade and to follow up with allergist/immunologist and be sent home with epipen on DC. Plan for transition to oral steroids. Continue with H1/H2 blockers. Was found to have evidence of pyelonephritis on CT.  Was started on course of IV Rocephin. She was downgraded on 10/17.     She is medically stable for discharge with outpatient followup with allergy & immunology and completion of her 7 day course of antibiotics.      #Idiopathic Angioedema   - ENT eval appreciated. HOB @ 45 degrees.  Aspiration precautions. Prednisone 60 mg. benadryl PRN. C/w H1/H2 blocker.  - f/u allergy Outpatient  - recommend to send home with Epi-pen    # possible pyelonephritis?  - started on rocephin IV  - vantin 200 q12 for full 7 day course @ home

## 2022-10-18 NOTE — DISCHARGE NOTE NURSING/CASE MANAGEMENT/SOCIAL WORK - NSDCPEEMAIL_GEN_ALL_CORE
[facility] email [email or web address]

## 2022-10-18 NOTE — PROGRESS NOTE ADULT - ASSESSMENT
time spendt on discharge >30 minutes including coordination of discharge care    discharge home today- plan for f/u with outpatient pcp (prior to admission pcp Dr. Reyes in [place] but may f/u with new pcp)- needs to f/u with allergy and immunology    #Angioedema  -resolving  -continue prednisone 40 mg daily x 5 more days then stop  -outpatient f/u with ENT/allergy & immunology/PCP  -epipen sent to pharmacy for discharge---use prn for any signs of angioedema or anaphylaxis  -avoid any cosmetic procedures to face/lips until f/u with allergy/immunology    #h/o UTI with residual R pyelonephritis  -UA + but no Urine cx done---patient has been on iv ctx x 3 days--will transition to oral vantin 200 mg q12 hr (first dose given this morning to monitor for any reaction)    #Tobacco/smoking history  -continue nicoderm cq (rx sent to pharmacy for 2 weeks of patches)  -smoking cessation counseling    DVT/GI ppx    DISCHARGE home today      
Assessment and Plan    IMPRESSION  Idiopathic Angioedema   Elevated blood pressure  Right hydronephrosis     PLAN    CNS: Avoid CNS Depressants.     HEENT: Oral care. Aspiration precautions. Check c1 inhibitor, complement C4. Start solumedrol 60mg daily. Start Loratidine 10mg twice daily.  ENT eval     PULMONARY: HOB @ 45. Monitor Pulse Ox. Keep > 93%. Supplement as needed. CXR in AM     CARDIOVASCULAR: Elevated blood pressure. Monitor BP. Will hold off on antihypertensives.     GI: GI prophylaxis. Keep NPO for now. Avoid hepatotoxic meds.     RENAL: Avoid nephrotoxic agents Replete electrolytes as needed     INFECTIOUS DISEASE: UA negative. No leukocytosis. No concern for infection. Monitor off Abx.  Check ESR, CRP     HEMATOLOGICAL: DVT prophylaxis. Monitor Hb, no acute signs of bleeding    ENDOCRINE: Keep FS <180    MUSCULOSKELETAL: Ambulate as tolerated    CODE STATUS: FULL     DISPOSITION: MICU      Others  - DVT Prophylaxis: Lovenox 40mg Subcutaneously daily  - Diet: NPO until ENT eval  - Code Status: Full    
IMPRESSION:    Angioedema-improving  Ho angioedema  ? UTI/ pyelo ( no fever, wbc)    PLAN:    CNS: avoid sedatives    HEENT: Oral care    PULMONARY:  HOB @ 45 degrees.  Aspiration precautions. Prednisone 40 mg.     CARDIOVASCULAR: avoid volume overload, IVF    GI: GI prophylaxis.  Feeding.  Bowel regimen     RENAL:  Follow up lytes.  Correct as needed    INFECTIOUS DISEASE: Follow up cultures, abx    HEMATOLOGICAL:  DVT prophylaxis.    ENDOCRINE:  Follow up FS.  Insulin protocol if needed.    MUSCULOSKELETAL: AAT    floor

## 2022-10-18 NOTE — PROGRESS NOTE ADULT - SUBJECTIVE AND OBJECTIVE BOX
Over Night Events: events noted, feels better, denies dysuria    PHYSICAL EXAM    ICU Vital Signs Last 24 Hrs  T(C): 36.1 (17 Oct 2022 08:00), Max: 37 (17 Oct 2022 04:00)  T(F): 96.9 (17 Oct 2022 08:00), Max: 98.6 (17 Oct 2022 04:00)  HR: 66 (17 Oct 2022 08:00) (62 - 88)  BP: 85/51 (17 Oct 2022 08:00) (85/51 - 119/76)  BP(mean): 64 (17 Oct 2022 08:00) (64 - 96)  RR: 16 (17 Oct 2022 08:00) (13 - 39)  SpO2: 98% (17 Oct 2022 08:00) (95% - 100%)    O2 Parameters below as of 17 Oct 2022 08:00  Patient On (Oxygen Delivery Method): room air            General: comfortable  Lungs: Bilateral BS  Cardiovascular: Regular   Abdomen: Soft, Positive BS  Extremities: No clubbing   Skin: Warm  Neurological: Non focal       10-16-22 @ 07:01  -  10-17-22 @ 07:00  --------------------------------------------------------  IN:    IV PiggyBack: 50 mL    Oral Fluid: 2880 mL  Total IN: 2930 mL    OUT:    Voided (mL): 1902 mL  Total OUT: 1902 mL    Total NET: 1028 mL          LABS:                          11.2   9.50  )-----------( 223      ( 17 Oct 2022 04:56 )             31.2                                               10-17    140  |  104  |  13  ----------------------------<  99  3.9   |  24  |  0.7    Ca    8.8      17 Oct 2022 04:56  Mg     1.8     10-17    TPro  6.2  /  Alb  4.0  /  TBili  <0.2  /  DBili  x   /  AST  12  /  ALT  10  /  AlkPhos  31  10-17                                             Urinalysis Basic - ( 16 Oct 2022 02:41 )    Color: Light Yellow / Appearance: Clear / S.050 / pH: x  Gluc: x / Ketone: Trace  / Bili: Negative / Urobili: <2 mg/dL   Blood: x / Protein: Trace / Nitrite: Negative   Leuk Esterase: Small / RBC: 2 /HPF / WBC 6 /HPF   Sq Epi: x / Non Sq Epi: 3 /HPF / Bacteria: Negative                                                  LIVER FUNCTIONS - ( 17 Oct 2022 04:56 )  Alb: 4.0 g/dL / Pro: 6.2 g/dL / ALK PHOS: 31 U/L / ALT: 10 U/L / AST: 12 U/L / GGT: x                                                                                                                                       MEDICATIONS  (STANDING):  cefTRIAXone   IVPB 1000 milliGRAM(s) IV Intermittent every 24 hours  chlorhexidine 2% Cloths 1 Application(s) Topical daily  enoxaparin Injectable 40 milliGRAM(s) SubCutaneous every 24 hours  famotidine    Tablet 20 milliGRAM(s) Oral two times a day  influenza   Vaccine 0.5 milliLiter(s) IntraMuscular once  loratadine 10 milliGRAM(s) Oral two times a day  nicotine -   7 mG/24Hr(s) Patch 1 Patch Transdermal daily  predniSONE   Tablet 60 milliGRAM(s) Oral daily    MEDICATIONS  (PRN):  acetaminophen     Tablet .. 650 milliGRAM(s) Oral every 6 hours PRN Temp greater or equal to 38C (100.4F), Mild Pain (1 - 3)  aluminum hydroxide/magnesium hydroxide/simethicone Suspension 30 milliLiter(s) Oral every 4 hours PRN Dyspepsia  melatonin 3 milliGRAM(s) Oral at bedtime PRN Insomnia  ondansetron Injectable 4 milliGRAM(s) IV Push every 8 hours PRN Nausea and/or Vomiting    
  Patient is a 27y old  Female who presents with a chief complaint of Upper lip swelling (18 Oct 2022 09:55)    HPI:  28 y/o female with no pertinent medical history presenting with complaint of lip swelling. Patient stated that she started to notice a cramping diffuse abdominal pain yesterday and attributed this to her period pains although they felt different from her usual symptoms. Then two hous prior to presentation in the ED, she noticed that her upper lip started swelling. She states that she has had this happen before and described it as  having allergic reaction. She does not know what she is allergic to. She stated the last time she recalls having this episode was after her breast implant surgery. She denies any change in her diet, shortness of breath, nausea, vomiting, rash, pruritus, OCP use or urinary symptoms. She denies any family history of pertinent mucosal swelling or "allergic reactions". Of note, patient states that her blood pressure is usually elevated but takes no  medication for it     In the ED, vitals were /99, HR 97, RR 20, T 98.4, SpO2 99% on RA. Transvaginal US showed No definite sonographic evidence of ovarian torsion. CTAP showed Mild right hydronephrosis without visualization of an obstructing calculus. Labs were unremarkable. UA negative. Patient received 1L NS bolus, 10mg IV decadron and 25mg benadryl in the ED.     Patient is being admitted for Idiopathic  angioedema    (16 Oct 2022 03:04)    PAST MEDICAL & SURGICAL HISTORY:  Blood pressure elevated without history of HTN    SURGICAL HX:   h/o breast implantation    h/o prior lip filler (remote history >1 year ago)        patient seen and examined independently on morning rounds for the first time today, chart reviewed and discussed with medicine resident     overnight downgraded from ICU To reg floor- lip swelling has improved and no dysphagia or respiratory compromise- no chest pain- stable for dc home today     Vital Signs Last 24 Hrs  T(C): 35.8 (18 Oct 2022 04:28), Max: 36 (17 Oct 2022 14:18)  T(F): 96.5 (18 Oct 2022 04:28), Max: 96.8 (17 Oct 2022 14:18)  HR: 75 (18 Oct 2022 08:45) (59 - 87)  BP: 108/59 (18 Oct 2022 08:45) (82/46 - 119/72)  BP(mean): 70 (18 Oct 2022 05:38) (60 - 96)  RR: 18 (18 Oct 2022 04:28) (18 - 18)  SpO2: 97% (17 Oct 2022 14:18) (97% - 97%)    Parameters below as of 18 Oct 2022 04:28  Patient On (Oxygen Delivery Method): room air    PE:  GEN-NAD, AAOx3, +upper lip edema  PULM- CTAB, fair air entry  CVS- +s1/s2 RRR no murmurs  GI- soft NT ND +bs, no rebound, no guarding  EXT- no edema                          11.2   9.50  )-----------( 223      ( 17 Oct 2022 04:56 )             31.2     10-17    140  |  104  |  13  ----------------------------<  99  3.9   |  24  |  0.7    Ca    8.8      17 Oct 2022 04:56  Mg     1.8     10-17    TPro  6.2  /  Alb  4.0  /  TBili  <0.2  /  DBili  x   /  AST  12  /  ALT  10  /  AlkPhos  31  10-17              MEDICATIONS  (STANDING):  cefpodoxime 200 milliGRAM(s) Oral every 12 hours  chlorhexidine 2% Cloths 1 Application(s) Topical daily  enoxaparin Injectable 40 milliGRAM(s) SubCutaneous every 24 hours  famotidine    Tablet 20 milliGRAM(s) Oral two times a day  influenza   Vaccine 0.5 milliLiter(s) IntraMuscular once  loratadine 10 milliGRAM(s) Oral two times a day  methylphenidate 10 milliGRAM(s) Oral <User Schedule>  nicotine -   7 mG/24Hr(s) Patch 1 Patch Transdermal daily  predniSONE   Tablet 40 milliGRAM(s) Oral daily  
Location: [facility]  A ([facility] ICU)  Patient Name: RAYMUNDO ROJAS  Age: 27y  Gender: Female    Past Medical and Surgical History:  Blood pressure elevated without history of HTN    No significant past surgical history        Social History:  Social History:      Allergies:  Allergy Status Unknown      Patient is a 27y old Female who presents with a chief complaint of   Primary diagnosis of     Progress Note  This morning patient was seen and examined at bedside.    Today is hospital day 1  Hospital Course  26 y/o female with no pertinent medical history presenting with complaint of lip swelling. Patient stated that she started to notice a cramping diffuse abdominal pain yesterday and attributed this to her period pains although they felt different from her usual symptoms. Then two hous prior to presentation in the ED, she noticed that her upper lip started swelling. She states that she has had this happen before and described it as  having allergic reaction. She does not know what she is allergic to. She stated the last time she recalls having this episode was after her breast implant surgery. She denies any change in her diet, shortness of breath, nausea, vomiting, rash, pruritus, OCP use or urinary symptoms. She denies any family history of pertinent mucosal swelling or "allergic reactions". Of note, patient states that her blood pressure is usually elevated but takes no  medication for it     In the ED, vitals were /99, HR 97, RR 20, T 98.4, SpO2 99% on RA. Transvaginal US showed No definite sonographic evidence of ovarian torsion. CTAP showed Mild right hydronephrosis without visualization of an obstructing calculus. Labs were unremarkable. UA negative. Patient received 1L NS bolus, 10mg IV decadron and 25mg benadryl in the ED.     Patient is being admitted for Idiopathic  angioedema       Overnight events      Vital Signs in the last 24 hours   Vitals Summary T(C): 36 (10-16-22 @ 04:00), Max: 36.9 (10-15-22 @ 22:19)  HR: 60 (10-16-22 @ 07:00) (58 - 97)  BP: 94/53 (10-16-22 @ 07:00) (94/53 - 150/99)  RR: 14 (10-16-22 @ 07:00) (14 - 20)  SpO2: 98% (10-16-22 @ 07:00) (98% - 100%)  Vent Data   Intake/ Output   10-15-22 @ 07:01  -  10-16-22 @ 07:00  --------------------------------------------------------  IN: 60 mL / OUT: 0 mL / NET: 60 mL          Physical Exam  GENERAL: NAD, speaks in full sentences, no signs of respiratory distress, mucosal upper swelling  HEAD:  Atraumatic, Normocephalic  EYES: EOMI, PERRLA, anicteric sclera  NECK: Supple, No JVD, no swelling larynx  CHEST/LUNG: Clear to auscultation bilaterally; No wheeze; No crackles; No accessory muscles used  HEART: Regular rate and rhythm; No murmurs;   ABDOMEN: Soft, Nontender, Nondistended; Bowel sounds present; No guarding  EXTREMITIES:  2+ Peripheral Pulses, No cyanosis or edema  PSYCH: AAOx3  NEUROLOGY: non-focal  SKIN: No rashes or lesions          Investigations   Laboratory Workup  - CBC:                        12.5   8.80  )-----------( 255      ( 15 Oct 2022 22:50 )             34.9     - Chemistry:  10-15    139  |  101  |  18  ----------------------------<  94  4.1   |  25  |  0.7    Ca    10.1      15 Oct 2022 22:50    TPro  7.9  /  Alb  5.0  /  TBili  0.3  /  DBili  x   /  AST  21  /  ALT  15  /  AlkPhos  38  10-15    - Coagulation Studies:    - ABG:    - Cardiac Markers:        Microbiological Workup  Urinalysis Basic - ( 16 Oct 2022 02:41 )    Color: Light Yellow / Appearance: Clear / S.050 / pH: x  Gluc: x / Ketone: Trace  / Bili: Negative / Urobili: <2 mg/dL   Blood: x / Protein: Trace / Nitrite: Negative   Leuk Esterase: Small / RBC: 2 /HPF / WBC 6 /HPF   Sq Epi: x / Non Sq Epi: 3 /HPF / Bacteria: Negative          Radiological Workup  *      Current Medications  Standing Medications  chlorhexidine 2% Cloths 1 Application(s) Topical daily  enoxaparin Injectable 40 milliGRAM(s) SubCutaneous every 24 hours  influenza   Vaccine 0.5 milliLiter(s) IntraMuscular once  loratadine 10 milliGRAM(s) Oral two times a day  methylPREDNISolone sodium succinate Injectable 60 milliGRAM(s) IV Push daily    PRN Medications  acetaminophen     Tablet .. 650 milliGRAM(s) Oral every 6 hours PRN Temp greater or equal to 38C (100.4F), Mild Pain (1 - 3)  aluminum hydroxide/magnesium hydroxide/simethicone Suspension 30 milliLiter(s) Oral every 4 hours PRN Dyspepsia  melatonin 3 milliGRAM(s) Oral at bedtime PRN Insomnia  ondansetron Injectable 4 milliGRAM(s) IV Push every 8 hours PRN Nausea and/or Vomiting    Singles Doses Administered  (ADM OVERRIDE) 1 each &lt;see task&gt; GiveOnce  (ADM OVERRIDE) 1 each &lt;see task&gt; GiveOnce  (ADM OVERRIDE) 1 each &lt;see task&gt; GiveOnce  (ADM OVERRIDE) 1 each &lt;see task&gt; GiveOnce  (ADM OVERRIDE) 1 each &lt;see task&gt; GiveOnce  (ADM OVERRIDE) 1 each &lt;see task&gt; GiveOnce  (Floorstock) 1 each &lt;see task&gt; GiveOnce  dexAMETHasone  Injectable 10 milliGRAM(s) IV Push Once  diphenhydrAMINE Injectable 50 milliGRAM(s) IV Push once  famotidine Injectable 40 milliGRAM(s) IV Push Once  ondansetron Injectable 4 milliGRAM(s) IV Push once  sodium chloride 0.9% Bolus 1000 milliLiter(s) IV Bolus once  tranexamic acid IVPB 1000 milliGRAM(s) IV Intermittent Once

## 2022-10-18 NOTE — DISCHARGE NOTE PROVIDER - NSDCCPCAREPLAN_GEN_ALL_CORE_FT
PRINCIPAL DISCHARGE DIAGNOSIS  Diagnosis: Swelling of upper lip  Assessment and Plan of Treatment:   Angioedema  WHAT YOU NEED TO KNOW:  What is angioedema? Angioedema is sudden swelling caused by fluid that collects in deep layers of the skin. Swelling occurs most often on the face, lips, tongue, or throat, but it can happen anywhere in the body.   What increases my risk for angioedema? The exact cause of angioedema is often unknown. The following may increase your risk or trigger symptoms:   •Allergic reactions to foods, insect stings, or latex   •Medicines, such as ACE inhibitors, NSAIDs, and aspirin   •Cold, heat, pressure, trauma, or emotional stress   •A medical condition, such as autoimmune thyroid disease, lupus, or cancer   •A family history of angioedema   What are the signs and symptoms of angioedema? Skin swelling may be the only symptom. Swelling may be on one or both sides of the affected area. You may also have any of the following:   •Pain and burning in the swollen area   •Hives or an itchy rash  •A cough, wheezing, and shortness of breath  •Irritated eyes and nose  •Abdominal pain   How is angioedema treated? Angioedema usually goes away within 3 days without treatment, but it may come back. You may need any of the following:   •Antihistamines decrease symptoms such as itching or a rash.   •Epinephrine is medicine used to treat severe allergic reactions such as anaphylaxis.   •Steroids may be given to decrease inflammation.  Call 911 for signs or symptoms of anaphylaxis, such as trouble breathing, swelling in your mouth or throat, or wheezing. You may also have itching, a rash, hives, or feel like you are going to faint.  When should I seek immediate care?   •You have sudden behavior changes or irritability.   •You are dizzy and your heart is beating faster than usual.         SECONDARY DISCHARGE DIAGNOSES  Diagnosis: Right lower quadrant pain  Assessment and Plan of Treatment:

## 2022-10-18 NOTE — DISCHARGE NOTE PROVIDER - NSDCMRMEDTOKEN_GEN_ALL_CORE_FT
cefpodoxime 200 mg oral tablet: 1 tab(s) orally every 12 hours  predniSONE 20 mg oral tablet: 2 tab(s) orally once a day  spironolactone 50 mg oral tablet: 1 tab(s) orally once a day   cefpodoxime 200 mg oral tablet: 1 tab(s) orally every 12 hours from 10/18  pantoprazole 40 mg oral delayed release tablet: 1 tab(s) orally once a day   predniSONE 20 mg oral tablet: 2 tab(s) orally once a day  spironolactone 50 mg oral tablet: 1 tab(s) orally once a day

## 2022-10-18 NOTE — DISCHARGE NOTE NURSING/CASE MANAGEMENT/SOCIAL WORK - NSDCPEWEB_GEN_ALL_CORE
[facility] website --- [email or web address] website --- [email or web address]

## 2022-10-18 NOTE — DISCHARGE NOTE NURSING/CASE MANAGEMENT/SOCIAL WORK - PATIENT PORTAL LINK FT
You can access the FollowMyHealth Patient Portal offered by [facility] by registering at the following website: [email or web address]. By joining [facility]’s FollowMyHealth portal, you will also be able to view your health information using other applications (apps) compatible with our [email or web address]

## 2022-10-20 LAB — C1INH FUNCTIONAL/C1INH TOTAL MFR SERPL: 33 MG/DL — SIGNIFICANT CHANGE UP (ref 21–39)

## 2022-10-23 DIAGNOSIS — N12 TUBULO-INTERSTITIAL NEPHRITIS, NOT SPECIFIED AS ACUTE OR CHRONIC: ICD-10-CM

## 2022-10-23 DIAGNOSIS — R10.31 RIGHT LOWER QUADRANT PAIN: ICD-10-CM

## 2022-10-23 DIAGNOSIS — T78.3XXA ANGIONEUROTIC EDEMA, INITIAL ENCOUNTER: ICD-10-CM

## 2022-10-23 DIAGNOSIS — Z82.49 FAMILY HISTORY OF ISCHEMIC HEART DISEASE AND OTHER DISEASES OF THE CIRCULATORY SYSTEM: ICD-10-CM

## 2022-10-23 DIAGNOSIS — R22.0 LOCALIZED SWELLING, MASS AND LUMP, HEAD: ICD-10-CM

## 2022-10-23 DIAGNOSIS — R03.0 ELEVATED BLOOD-PRESSURE READING, WITHOUT DIAGNOSIS OF HYPERTENSION: ICD-10-CM

## 2022-10-23 LAB
CULTURE RESULTS: SIGNIFICANT CHANGE UP
SPECIMEN SOURCE: SIGNIFICANT CHANGE UP

## 2025-08-02 ENCOUNTER — EMERGENCY (EMERGENCY)
Facility: HOSPITAL | Age: 30
LOS: 0 days | Discharge: HOME | End: 2025-08-02
Attending: EMERGENCY MEDICINE
Payer: MEDICAID

## 2025-08-02 VITALS
RESPIRATION RATE: 17 BRPM | HEIGHT: 68 IN | SYSTOLIC BLOOD PRESSURE: 121 MMHG | WEIGHT: 144.4 LBS | DIASTOLIC BLOOD PRESSURE: 81 MMHG | HEART RATE: 68 BPM | OXYGEN SATURATION: 100 % | TEMPERATURE: 98 F

## 2025-08-02 DIAGNOSIS — M54.50 LOW BACK PAIN, UNSPECIFIED: ICD-10-CM

## 2025-08-02 PROBLEM — R03.0 ELEVATED BLOOD-PRESSURE READING, WITHOUT DIAGNOSIS OF HYPERTENSION: Chronic | Status: ACTIVE | Noted: 2022-10-16

## 2025-08-02 LAB
APPEARANCE UR: CLEAR — SIGNIFICANT CHANGE UP
BILIRUB UR-MCNC: NEGATIVE — SIGNIFICANT CHANGE UP
COLOR SPEC: YELLOW — SIGNIFICANT CHANGE UP
DIFF PNL FLD: NEGATIVE — SIGNIFICANT CHANGE UP
GLUCOSE UR QL: NEGATIVE MG/DL — SIGNIFICANT CHANGE UP
KETONES UR QL: NEGATIVE MG/DL — SIGNIFICANT CHANGE UP
LEUKOCYTE ESTERASE UR-ACNC: NEGATIVE — SIGNIFICANT CHANGE UP
NITRITE UR-MCNC: NEGATIVE — SIGNIFICANT CHANGE UP
PH UR: 6.5 — SIGNIFICANT CHANGE UP (ref 5–8)
PROT UR-MCNC: NEGATIVE MG/DL — SIGNIFICANT CHANGE UP
SP GR SPEC: 1.01 — SIGNIFICANT CHANGE UP (ref 1–1.03)
UROBILINOGEN FLD QL: 0.2 MG/DL — SIGNIFICANT CHANGE UP (ref 0.2–1)

## 2025-08-02 PROCEDURE — 99284 EMERGENCY DEPT VISIT MOD MDM: CPT

## 2025-08-02 PROCEDURE — 81025 URINE PREGNANCY TEST: CPT

## 2025-08-02 PROCEDURE — 99283 EMERGENCY DEPT VISIT LOW MDM: CPT | Mod: 25

## 2025-08-02 PROCEDURE — 96372 THER/PROPH/DIAG INJ SC/IM: CPT

## 2025-08-02 PROCEDURE — 81003 URINALYSIS AUTO W/O SCOPE: CPT

## 2025-08-02 RX ORDER — METHOCARBAMOL 500 MG/1
2 TABLET, FILM COATED ORAL
Qty: 30 | Refills: 0
Start: 2025-08-02 | End: 2025-08-06

## 2025-08-02 RX ORDER — IBUPROFEN 200 MG
1 TABLET ORAL
Qty: 21 | Refills: 0
Start: 2025-08-02 | End: 2025-08-08

## 2025-08-02 RX ORDER — LIDOCAINE HYDROCHLORIDE 20 MG/ML
1 JELLY TOPICAL
Qty: 14 | Refills: 0
Start: 2025-08-02 | End: 2025-08-08

## 2025-08-02 RX ORDER — KETOROLAC TROMETHAMINE 30 MG/ML
30 INJECTION, SOLUTION INTRAMUSCULAR; INTRAVENOUS ONCE
Refills: 0 | Status: DISCONTINUED | OUTPATIENT
Start: 2025-08-02 | End: 2025-08-02

## 2025-08-02 RX ADMIN — KETOROLAC TROMETHAMINE 30 MILLIGRAM(S): 30 INJECTION, SOLUTION INTRAMUSCULAR; INTRAVENOUS at 15:36
